# Patient Record
Sex: MALE | Race: OTHER | HISPANIC OR LATINO
[De-identification: names, ages, dates, MRNs, and addresses within clinical notes are randomized per-mention and may not be internally consistent; named-entity substitution may affect disease eponyms.]

---

## 2017-01-23 PROBLEM — Z00.00 ENCOUNTER FOR PREVENTIVE HEALTH EXAMINATION: Status: ACTIVE | Noted: 2017-01-23

## 2019-08-04 ENCOUNTER — TRANSCRIPTION ENCOUNTER (OUTPATIENT)
Age: 29
End: 2019-08-04

## 2022-09-21 ENCOUNTER — APPOINTMENT (OUTPATIENT)
Dept: ORTHOPEDIC SURGERY | Facility: CLINIC | Age: 32
End: 2022-09-21

## 2022-09-21 VITALS
SYSTOLIC BLOOD PRESSURE: 116 MMHG | DIASTOLIC BLOOD PRESSURE: 65 MMHG | HEIGHT: 74 IN | HEART RATE: 62 BPM | WEIGHT: 222 LBS | BODY MASS INDEX: 28.49 KG/M2

## 2022-09-21 DIAGNOSIS — M47.817 SPONDYLOSIS W/OUT MYELOPATHY OR RADICULOPATHY, LUMBOSACRAL REGION: ICD-10-CM

## 2022-09-21 DIAGNOSIS — M41.9 SCOLIOSIS, UNSPECIFIED: ICD-10-CM

## 2022-09-21 PROCEDURE — 99203 OFFICE O/P NEW LOW 30 MIN: CPT

## 2022-09-21 PROCEDURE — 72082 X-RAY EXAM ENTIRE SPI 2/3 VW: CPT

## 2022-09-23 PROBLEM — M41.9 SCOLIOSIS: Status: ACTIVE | Noted: 2022-09-23

## 2022-09-23 PROBLEM — M47.817 LUMBOSACRAL SPONDYLOSIS: Status: ACTIVE | Noted: 2022-09-23

## 2022-09-23 NOTE — DISCUSSION/SUMMARY
[de-identified] : We discussed further treatment options.  He will try course of physical therapy.  Advanced imaging if not improved or worsen.

## 2022-09-23 NOTE — HISTORY OF PRESENT ILLNESS
[de-identified] : Mr. JOHNIE RODRIGUEZ  is a 32 year old male who presents with back pain.  He has a history of prior scoliosis surgery.  Denies any LE radicular symptoms.  Normal bowel and bladder control.   Denies any recent fevers, chills, sweats, weight loss, or infection.\par \par The patients past medical history, past surgical history, medications, allergies, and social history were reviewed by me today with the patient and documented accordingly.  In addition, the patient's family history, which is noncontributory to their visit, was also reviewed.\par

## 2022-09-23 NOTE — PHYSICAL EXAM
[Antalgic] : not antalgic [Ataxic] : not ataxic [de-identified] : Examination of the thoracic and lumbar spine reveals no midline tenderness palpation, step-offs, or skin lesions.  Healed thoracolumbar incision.  Decreased range of motion with respect to flexion, extension, lateral bending, and rotation. No tenderness to palpation of the sciatic notch. No tenderness palpation of the bilateral greater trochanters. No pain with passive internal/external rotation of the hips. No instability of bilateral lower extremities.  Negative DEMETRA. Negative straight leg raise bilaterally. No bowstring. Negative femoral stretch. 5 out of 5 iliopsoas, hip abductors, hips adductors, quadriceps, hamstrings, gastrocsoleus, tibialis anterior, extensor hallucis longus, peroneals. Grossly intact sensation to light touch bilateral lower extremities. 1+ patellar and Achilles reflexes. Downgoing Babinski. No clonus. Intact proprioception. Palpable pulses. No skin lesion and no edema on the right and left lower extremities. [de-identified] : AP lateral scoliosis x-rays taken today reveals prior scoliosis fusion.  Some adjacent segment degeneration below the fusion.

## 2023-01-19 ENCOUNTER — NON-APPOINTMENT (OUTPATIENT)
Age: 33
End: 2023-01-19

## 2024-05-09 ENCOUNTER — EMERGENCY (EMERGENCY)
Facility: HOSPITAL | Age: 34
LOS: 1 days | Discharge: HOME | End: 2024-05-09
Attending: EMERGENCY MEDICINE
Payer: COMMERCIAL

## 2024-05-09 VITALS
DIASTOLIC BLOOD PRESSURE: 73 MMHG | HEART RATE: 68 BPM | SYSTOLIC BLOOD PRESSURE: 111 MMHG | TEMPERATURE: 98 F | OXYGEN SATURATION: 98 % | RESPIRATION RATE: 20 BRPM

## 2024-05-09 VITALS
SYSTOLIC BLOOD PRESSURE: 138 MMHG | WEIGHT: 220.02 LBS | OXYGEN SATURATION: 99 % | DIASTOLIC BLOOD PRESSURE: 84 MMHG | RESPIRATION RATE: 20 BRPM | HEIGHT: 74 IN | TEMPERATURE: 98 F | HEART RATE: 62 BPM

## 2024-05-09 PROCEDURE — 99284 EMERGENCY DEPT VISIT MOD MDM: CPT

## 2024-05-09 PROCEDURE — 99283 EMERGENCY DEPT VISIT LOW MDM: CPT

## 2024-05-09 RX ORDER — OXYCODONE HYDROCHLORIDE 5 MG/1
5 TABLET ORAL ONCE
Refills: 0 | Status: DISCONTINUED | OUTPATIENT
Start: 2024-05-09 | End: 2024-05-09

## 2024-05-09 RX ORDER — OXYCODONE HYDROCHLORIDE 5 MG/1
1 TABLET ORAL
Qty: 12 | Refills: 0
Start: 2024-05-09 | End: 2024-05-11

## 2024-05-09 RX ORDER — IBUPROFEN 200 MG
600 TABLET ORAL ONCE
Refills: 0 | Status: DISCONTINUED | OUTPATIENT
Start: 2024-05-09 | End: 2024-05-12

## 2024-05-09 RX ORDER — ACETAMINOPHEN 500 MG
975 TABLET ORAL ONCE
Refills: 0 | Status: DISCONTINUED | OUTPATIENT
Start: 2024-05-09 | End: 2024-05-12

## 2024-05-09 RX ADMIN — OXYCODONE HYDROCHLORIDE 5 MILLIGRAM(S): 5 TABLET ORAL at 11:18

## 2024-05-09 RX ADMIN — OXYCODONE HYDROCHLORIDE 5 MILLIGRAM(S): 5 TABLET ORAL at 12:10

## 2024-05-09 NOTE — ED PROVIDER NOTE - NSFOLLOWUPCLINICS_GEN_ALL_ED_FT
[facility]  Orthopedic Surgery  [address], [address]rd & [address]th [place] [address]  Phone: [phone]  Fax:

## 2024-05-09 NOTE — ED PROVIDER NOTE - OBJECTIVE STATEMENT
33-year-old male no past medical history presenting to the emergency department with 7 days of right shoulder pain.  Patient believes that he strained it 1 week ago and every day since it is progressively gotten worse.  3 days ago was seen by orthopedics.  At that time had x-rays had a cortisone injection of the shoulder and it was scheduled for an MRI at 7 PM today.  Coming to the emergency department with worsening pain.  Pain is worse with shoulder movement.  Took Aleve this morning without relief of his symptoms.  No fevers no chills no erythema to the joint.

## 2024-05-09 NOTE — ED PROVIDER NOTE - CLINICAL SUMMARY MEDICAL DECISION MAKING FREE TEXT BOX
Elliot: 33 year old male with no pmhx here with 7 days of R shoulder pain.  strained it one week ago and gotten worse. had cortisone injection and mri today but pain svere. pE: alert, nad, nonlabored respirations, +s1s2, R shoulder: not able to abduct arm past head,, + ttp over right humeral head. alert and orietned x 3, + 2 radial pulse intact, sensation intact b/l ue, skin intact. plan: will give pain control, patient with msk strain of R shoulder. has f/u outpatient already and mri scheduled this evening. will give pain control to get to MRI this evning.
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
## 2024-05-09 NOTE — ED ADULT TRIAGE NOTE - CHIEF COMPLAINT QUOTE
R shoulder pain since Friday. Had  a cortisone shot with partial relief. Taking over the counter medication with not relief.  Sent by ortho for MRI

## 2024-05-09 NOTE — ED PROVIDER NOTE - PATIENT PORTAL LINK FT
Problem: ABCDS Injury Assessment  Goal: Absence of physical injury  1/4/2024 1310 by Araceli Zafar RN  Outcome: Progressing  1/4/2024 0028 by Maite Madrid RN  Outcome: Progressing     Problem: Safety - Adult  Goal: Free from fall injury  1/4/2024 1310 by Araceli Zafar, MARY  Flowsheets (Taken 1/4/2024 1310)  Free From Fall Injury: Based on caregiver fall risk screen, instruct family/caregiver to ask for assistance with transferring infant if caregiver noted to have fall risk factors  1/4/2024 0028 by Maite Madrid, RN  Outcome: Progressing      You can access the FollowMyHealth Patient Portal offered by [facility] by registering at the following website: [email or web address]. By joining [email or web address]’s FollowMyHealth portal, you will also be able to view your health information using other applications (apps) compatible with our system.

## 2024-05-09 NOTE — ED PROVIDER NOTE - NSFOLLOWUPINSTRUCTIONS_ED_ALL_ED_FT
1- Follow up with your MRI tonight as scheduled and follow up with your orthopedist or our clinic  2- Ibuprofen 600 mg every 6 hours for pain  3- You can add Tylenol to this do not take more than 4 grams in a 24 hour period  4- Salon pas patches  5- Return to ER for any new or worsening complaints 1- Follow up with your MRI tonight as scheduled and follow up with your orthopedist or our clinic  2- Ibuprofen 600 mg every 6 hours for pain  3- You can add Tylenol to this do not take more than 4 grams in a 24 hour period  4- Salon pas patches  5- Return to ER for any new or worsening complaints  6- Oxycodone 5 mg every 6 hours for extreme pain, you can not drive on this medication

## 2024-05-09 NOTE — ED ADULT NURSE NOTE - OBJECTIVE STATEMENT
33y M A&O x3 ambulatory. Presenting with right shoulder pain. Pain began last Friday and has minimal ROM. Per pt "It feels sore and like a strain".  10/10 pain which is sharp and radiating to back of neck and down to elbow. 33y M A&O x3 ambulatory. Presenting with worsening right shoulder pain. Pain began last Friday and has minimal ROM. Per pt "It feels sore and like a strain". Strain has progressively gotten worse over past week. 3 days ago pt saw orthopedics where he received x-rays and a cortisone injection of the shoulder and was scheduled for an MRI at 7pm today. 10/10 pain, which is sharp and radiating to back of neck and down to elbow and pain with movement.

## 2024-05-09 NOTE — ED PROVIDER NOTE - MUSCULOSKELETAL, MLM
right elbow non tender full ROM.  Pt able to laterally raise right shoulder but unable to get arm over head secondary to pain.  + tender over right humeral head.

## 2024-05-09 NOTE — ED ADULT NURSE NOTE - BREATHING, MLM
Addended by: BRANDON MICHAEL on: 1/18/2019 10:41 AM     Modules accepted: Orders     Spontaneous, unlabored and symmetrical

## 2025-05-07 ENCOUNTER — APPOINTMENT (OUTPATIENT)
Dept: CT IMAGING | Facility: CLINIC | Age: 35
End: 2025-05-07